# Patient Record
(demographics unavailable — no encounter records)

---

## 2025-02-14 NOTE — PHYSICAL EXAM
[Normal] : oriented to person, place and time, the affect was normal, the mood was normal and not anxious [de-identified] : good cosmesis. Well healed  recent left and old right breast scars.

## 2025-02-14 NOTE — REVIEW OF SYSTEMS
[Negative] : Allergic/Immunologic [de-identified] : Cochlear implant [de-identified] : diabetes, hypothyroidism

## 2025-02-14 NOTE — DISEASE MANAGEMENT
[Pathological] : TNM Stage: p [I] : I [FreeTextEntry4] : invasive ductal carcinoma [TTNM] : 1a [NTNM] : 0 [MTNM] : X [de-identified] : 3000cGy [de-identified] : left breast ( LOQ)

## 2025-02-14 NOTE — HISTORY OF PRESENT ILLNESS
[FreeTextEntry1] : This 71-year-old female presents for radiation medicine consultation.  Ms. Khan was diagnosed with Left breast invasive ductal carcinoma.  She is now s/p Left lumpectomy with Dr. Rivas at [facility] on 1/8/2025.  Pathology showed well differentiated invasive ductal carcinoma, negative margins, 6 right axillary lymph nodes negative.  Of note, Ms. Khan received radiation therapy via Mammosite in 2010 at the direction of Dr. Lloyd for Right invasive ductal carcinoma.  She notes taking letrozole for 10 years and also took Prolia injections X3 at the direction of Dr. Richards.     Now follows with Dr. Sotomayor of [facility].  She was prescribed letrozole to begin after completing radiation therapy.

## 2025-02-14 NOTE — LETTER GREETING
[Dear Doctor] : Dear Doctor, [Consult Letter:] : Your patient, [unfilled] was seen in my office today for consultation. [Please see my note below.] : Please see my note below. [FreeTextEntry2] : MD Dr. Светлана Fernandes ([facility])

## 2025-02-14 NOTE — LETTER CLOSING
[Consult Closing:] : Thank you for allowing me to participate in the care of this patient.  If you have any questions, please do not hesitate to contact me. [Sincerely yours,] : Sincerely yours, [FreeTextEntry3] : Jonathan Lloyd MD Physician in Chief Department of Radiation Medicine [facility]   of Radiation Medicine [employer] at  [facility]  Radiation  [facility] at [place] [address]  Tel: [phone] Fax: [phone]

## 2025-02-14 NOTE — HISTORY OF PRESENT ILLNESS
[FreeTextEntry1] : This 71-year-old female presents for radiation medicine consultation.  Ms. Khan was diagnosed with Left breast invasive ductal carcinoma.  She is now s/p Left lumpectomy with Dr. Rivas at [facility] on 1/8/2025.  Pathology showed well differentiated invasive ductal carcinoma, negative margins, 6 right axillary lymph nodes negative.  Of note, Ms. Khan received radiation therapy via Mammosite in 2010 at the direction of Dr. Lloyd for Right invasive ductal carcinoma.  She notes taking letrozole for 10 years and also took Prolia injections X3 at the direction of Dr. Richards.     Now follows with Dr. Sotomayor of [facility].  She was prescribed letrozole to begin after completing radiation therapy.

## 2025-02-14 NOTE — REVIEW OF SYSTEMS
[Negative] : Allergic/Immunologic [de-identified] : Cochlear implant [de-identified] : diabetes, hypothyroidism

## 2025-02-14 NOTE — LETTER CLOSING
[Consult Closing:] : Thank you for allowing me to participate in the care of this patient.  If you have any questions, please do not hesitate to contact me. [Sincerely yours,] : Sincerely yours, [FreeTextEntry3] : Jonathan Lloyd MD Physician in Chief Department of Radiation Medicine [facility]   of Radiation Medicine [employer] at  [facility]  Radiation  [facility] at [place] [address]  Tel: [phone] Fax: [phone]

## 2025-02-14 NOTE — LETTER GREETING
[Dear Doctor] : Dear Doctor, [Consult Letter:] : Your patient, [unfilled] was seen in my office today for consultation. [Please see my note below.] : Please see my note below. [FreeTextEntry2] : MD Dr. Светлана Fernandes ([facility])

## 2025-02-14 NOTE — DISEASE MANAGEMENT
[Pathological] : TNM Stage: p [I] : I [FreeTextEntry4] : invasive ductal carcinoma [TTNM] : 1a [MTNM] : X [NTNM] : 0 [de-identified] : 3000cGy [de-identified] : left breast ( LOQ)

## 2025-02-14 NOTE — PHYSICAL EXAM
[Normal] : oriented to person, place and time, the affect was normal, the mood was normal and not anxious [de-identified] : good cosmesis. Well healed  recent left and old right breast scars.

## 2025-03-03 NOTE — DISEASE MANAGEMENT
[Pathological] : TNM Stage: p [FreeTextEntry4] : invasive ductal carcinoma [TTNM] : 1a [NTNM] : 0 [MTNM] : X [I] : I [de-identified] : 1200 cGy [de-identified] : 3000 cGy [de-identified] : Left breast

## 2025-03-10 NOTE — DISEASE MANAGEMENT
[Pathological] : TNM Stage: p [FreeTextEntry4] : invasive ductal carcinoma [TTNM] : 1a [NTNM] : 0 [MTNM] : X [I] : I [de-identified] : 3000 cGy [de-identified] : 3000 cGy [de-identified] : Left breast

## 2025-04-23 NOTE — DISEASE MANAGEMENT
[Pathological] : TNM Stage: p [I] : I [TTNM] : 1a [NTNM] : 0 [MTNM] : X [de-identified] : 3000 cGy [de-identified] : Left breast

## 2025-04-23 NOTE — HISTORY OF PRESENT ILLNESS
[FreeTextEntry1] : The patient is a 72 y/o woman well known to us (s/p breast radiotherapy with Mammosite in 2010). She is now s/p lumpectomy for early stage ([id number]) ER+, [place] neg, Her2 neg, left breast cancer.    Ms. Khan is now s/p Left partial breast radiation therapy, completed on 3/10/2025.  At last on-treatment visit: Left breast with moderate erythema. Incision well healed. Denied pain. Using Aquaphor to moisturize.

## 2025-07-30 NOTE — DISEASE MANAGEMENT
[Pathological] : TNM Stage: p [I] : I [TTNM] : 1a [NTNM] : 0 [MTNM] : 0 [de-identified] : 3000cGy [de-identified] : left breast

## 2025-07-30 NOTE — VITALS
[Maximal Pain Intensity: 3/10] : 3/10 [Pain Location: ___] : Pain Location: [unfilled] [OTC] : OTC [80: Normal activity with effort; some signs or symptoms of disease.] : 80: Normal activity with effort; some signs or symptoms of disease.  [ECOG Performance Status: 1 - Restricted in physically strenuous activity but ambulatory and able to carry out work of a light or sedentary nature] : Performance Status: 1 - Restricted in physically strenuous activity but ambulatory and able to carry out work of a light or sedentary nature, e.g., light house work, office work

## 2025-07-30 NOTE — REVIEW OF SYSTEMS
[Negative] : Allergic/Immunologic [FreeTextEntry9] : achy swelling of right lower extremity, especially  after a long drive.

## 2025-07-30 NOTE — PHYSICAL EXAM
[Normal] : oriented to person, place and time, the affect was normal, the mood was normal and not anxious [de-identified] : good cosmesis, R<L  firmness at ujpper out periareolar site of right breast .  . Left breast normal  feel ,non-tender , no nipple discharge.

## 2025-07-30 NOTE — HISTORY OF PRESENT ILLNESS
[FreeTextEntry1] : The patient is a 72 y/o woman well known to us (s/p breast radiotherapy with Mammosite in 2010). She is now s/p lumpectomy for early stage ([id number]) ER+, [place] neg, Her2 neg, left breast cancer.  Ms. Khan is now s/p Left partial breast radiation therapy, completed on 3/10/2025.